# Patient Record
Sex: FEMALE | Race: WHITE | NOT HISPANIC OR LATINO
[De-identification: names, ages, dates, MRNs, and addresses within clinical notes are randomized per-mention and may not be internally consistent; named-entity substitution may affect disease eponyms.]

---

## 2017-10-20 ENCOUNTER — APPOINTMENT (OUTPATIENT)
Dept: HUMAN REPRODUCTION | Facility: CLINIC | Age: 32
End: 2017-10-20

## 2018-03-25 ENCOUNTER — OUTPATIENT (OUTPATIENT)
Dept: OUTPATIENT SERVICES | Facility: HOSPITAL | Age: 33
LOS: 1 days | End: 2018-03-25
Payer: COMMERCIAL

## 2018-03-25 PROCEDURE — 76830 TRANSVAGINAL US NON-OB: CPT | Mod: 26

## 2018-03-25 PROCEDURE — 76830 TRANSVAGINAL US NON-OB: CPT

## 2018-09-22 ENCOUNTER — OUTPATIENT (OUTPATIENT)
Dept: OUTPATIENT SERVICES | Facility: HOSPITAL | Age: 33
LOS: 1 days | End: 2018-09-22
Payer: COMMERCIAL

## 2018-09-22 PROCEDURE — 76830 TRANSVAGINAL US NON-OB: CPT | Mod: 26

## 2018-09-22 PROCEDURE — 76830 TRANSVAGINAL US NON-OB: CPT

## 2018-09-23 ENCOUNTER — APPOINTMENT (OUTPATIENT)
Dept: ULTRASOUND IMAGING | Facility: HOSPITAL | Age: 33
End: 2018-09-23

## 2020-02-06 ENCOUNTER — TRANSCRIPTION ENCOUNTER (OUTPATIENT)
Age: 35
End: 2020-02-06

## 2020-02-06 ENCOUNTER — RESULT REVIEW (OUTPATIENT)
Age: 35
End: 2020-02-06

## 2020-02-07 ENCOUNTER — INPATIENT (INPATIENT)
Facility: HOSPITAL | Age: 35
LOS: 1 days | Discharge: HOME | End: 2020-02-09
Attending: SURGERY | Admitting: SURGERY
Payer: COMMERCIAL

## 2020-02-07 VITALS
RESPIRATION RATE: 18 BRPM | SYSTOLIC BLOOD PRESSURE: 128 MMHG | HEART RATE: 90 BPM | DIASTOLIC BLOOD PRESSURE: 82 MMHG | TEMPERATURE: 98 F | OXYGEN SATURATION: 100 %

## 2020-02-07 DIAGNOSIS — K56.50 INTESTINAL ADHESIONS [BANDS], UNSPECIFIED AS TO PARTIAL VERSUS COMPLETE OBSTRUCTION: ICD-10-CM

## 2020-02-07 LAB
ALBUMIN SERPL ELPH-MCNC: 4.6 G/DL — SIGNIFICANT CHANGE UP (ref 3.3–5)
ALP SERPL-CCNC: 73 U/L — SIGNIFICANT CHANGE UP (ref 40–120)
ALT FLD-CCNC: 18 U/L — SIGNIFICANT CHANGE UP (ref 4–33)
ANION GAP SERPL CALC-SCNC: 14 MMO/L — SIGNIFICANT CHANGE UP (ref 7–14)
APPEARANCE UR: CLEAR — SIGNIFICANT CHANGE UP
APTT BLD: 27.3 SEC — LOW (ref 27.5–36.3)
AST SERPL-CCNC: 15 U/L — SIGNIFICANT CHANGE UP (ref 4–32)
BASOPHILS # BLD AUTO: 0.02 K/UL — SIGNIFICANT CHANGE UP (ref 0–0.2)
BASOPHILS NFR BLD AUTO: 0.2 % — SIGNIFICANT CHANGE UP (ref 0–2)
BILIRUB SERPL-MCNC: 0.6 MG/DL — SIGNIFICANT CHANGE UP (ref 0.2–1.2)
BILIRUB UR-MCNC: NEGATIVE — SIGNIFICANT CHANGE UP
BLD GP AB SCN SERPL QL: NEGATIVE — SIGNIFICANT CHANGE UP
BLOOD UR QL VISUAL: NEGATIVE — SIGNIFICANT CHANGE UP
BUN SERPL-MCNC: 7 MG/DL — SIGNIFICANT CHANGE UP (ref 7–23)
CALCIUM SERPL-MCNC: 9.6 MG/DL — SIGNIFICANT CHANGE UP (ref 8.4–10.5)
CHLORIDE SERPL-SCNC: 102 MMOL/L — SIGNIFICANT CHANGE UP (ref 98–107)
CO2 SERPL-SCNC: 20 MMOL/L — LOW (ref 22–31)
COLOR SPEC: COLORLESS — SIGNIFICANT CHANGE UP
CREAT SERPL-MCNC: 0.59 MG/DL — SIGNIFICANT CHANGE UP (ref 0.5–1.3)
EOSINOPHIL # BLD AUTO: 0 K/UL — SIGNIFICANT CHANGE UP (ref 0–0.5)
EOSINOPHIL NFR BLD AUTO: 0 % — SIGNIFICANT CHANGE UP (ref 0–6)
GLUCOSE SERPL-MCNC: 143 MG/DL — HIGH (ref 70–99)
GLUCOSE UR-MCNC: NEGATIVE — SIGNIFICANT CHANGE UP
HCG SERPL-ACNC: < 5 MIU/ML — SIGNIFICANT CHANGE UP
HCT VFR BLD CALC: 42.4 % — SIGNIFICANT CHANGE UP (ref 34.5–45)
HGB BLD-MCNC: 13.8 G/DL — SIGNIFICANT CHANGE UP (ref 11.5–15.5)
IMM GRANULOCYTES NFR BLD AUTO: 0.3 % — SIGNIFICANT CHANGE UP (ref 0–1.5)
INR BLD: 1.08 — SIGNIFICANT CHANGE UP (ref 0.88–1.17)
KETONES UR-MCNC: NEGATIVE — SIGNIFICANT CHANGE UP
LACTATE BLDV-MCNC: 2 MMOL/L — SIGNIFICANT CHANGE UP (ref 0.5–2)
LEUKOCYTE ESTERASE UR-ACNC: NEGATIVE — SIGNIFICANT CHANGE UP
LIDOCAIN IGE QN: 71.5 U/L — HIGH (ref 7–60)
LYMPHOCYTES # BLD AUTO: 1.43 K/UL — SIGNIFICANT CHANGE UP (ref 1–3.3)
LYMPHOCYTES # BLD AUTO: 12.4 % — LOW (ref 13–44)
MCHC RBC-ENTMCNC: 26.3 PG — LOW (ref 27–34)
MCHC RBC-ENTMCNC: 32.5 % — SIGNIFICANT CHANGE UP (ref 32–36)
MCV RBC AUTO: 80.8 FL — SIGNIFICANT CHANGE UP (ref 80–100)
MONOCYTES # BLD AUTO: 0.29 K/UL — SIGNIFICANT CHANGE UP (ref 0–0.9)
MONOCYTES NFR BLD AUTO: 2.5 % — SIGNIFICANT CHANGE UP (ref 2–14)
NEUTROPHILS # BLD AUTO: 9.72 K/UL — HIGH (ref 1.8–7.4)
NEUTROPHILS NFR BLD AUTO: 84.6 % — HIGH (ref 43–77)
NITRITE UR-MCNC: NEGATIVE — SIGNIFICANT CHANGE UP
NRBC # FLD: 0 K/UL — SIGNIFICANT CHANGE UP (ref 0–0)
PH UR: 7 — SIGNIFICANT CHANGE UP (ref 5–8)
PLATELET # BLD AUTO: 332 K/UL — SIGNIFICANT CHANGE UP (ref 150–400)
PMV BLD: 11.1 FL — SIGNIFICANT CHANGE UP (ref 7–13)
POTASSIUM SERPL-MCNC: 3.8 MMOL/L — SIGNIFICANT CHANGE UP (ref 3.5–5.3)
POTASSIUM SERPL-SCNC: 3.8 MMOL/L — SIGNIFICANT CHANGE UP (ref 3.5–5.3)
PROT SERPL-MCNC: 7.3 G/DL — SIGNIFICANT CHANGE UP (ref 6–8.3)
PROT UR-MCNC: NEGATIVE — SIGNIFICANT CHANGE UP
PROTHROM AB SERPL-ACNC: 12.4 SEC — SIGNIFICANT CHANGE UP (ref 9.8–13.1)
RBC # BLD: 5.25 M/UL — HIGH (ref 3.8–5.2)
RBC # FLD: 13.6 % — SIGNIFICANT CHANGE UP (ref 10.3–14.5)
RH IG SCN BLD-IMP: POSITIVE — SIGNIFICANT CHANGE UP
SODIUM SERPL-SCNC: 136 MMOL/L — SIGNIFICANT CHANGE UP (ref 135–145)
SP GR SPEC: 1.03 — SIGNIFICANT CHANGE UP (ref 1–1.04)
UROBILINOGEN FLD QL: NORMAL — SIGNIFICANT CHANGE UP
WBC # BLD: 11.5 K/UL — HIGH (ref 3.8–10.5)
WBC # FLD AUTO: 11.5 K/UL — HIGH (ref 3.8–10.5)

## 2020-02-07 PROCEDURE — 74177 CT ABD & PELVIS W/CONTRAST: CPT | Mod: 26

## 2020-02-07 PROCEDURE — 71045 X-RAY EXAM CHEST 1 VIEW: CPT | Mod: 26

## 2020-02-07 PROCEDURE — 88307 TISSUE EXAM BY PATHOLOGIST: CPT | Mod: 26

## 2020-02-07 PROCEDURE — 44120 REMOVAL OF SMALL INTESTINE: CPT

## 2020-02-07 RX ORDER — SODIUM CHLORIDE 9 MG/ML
1000 INJECTION INTRAMUSCULAR; INTRAVENOUS; SUBCUTANEOUS ONCE
Refills: 0 | Status: COMPLETED | OUTPATIENT
Start: 2020-02-07 | End: 2020-02-07

## 2020-02-07 RX ORDER — ONDANSETRON 8 MG/1
4 TABLET, FILM COATED ORAL ONCE
Refills: 0 | Status: COMPLETED | OUTPATIENT
Start: 2020-02-07 | End: 2020-02-07

## 2020-02-07 RX ORDER — OXYCODONE HYDROCHLORIDE 5 MG/1
10 TABLET ORAL EVERY 6 HOURS
Refills: 0 | Status: DISCONTINUED | OUTPATIENT
Start: 2020-02-07 | End: 2020-02-09

## 2020-02-07 RX ORDER — ENOXAPARIN SODIUM 100 MG/ML
40 INJECTION SUBCUTANEOUS DAILY
Refills: 0 | Status: DISCONTINUED | OUTPATIENT
Start: 2020-02-07 | End: 2020-02-09

## 2020-02-07 RX ORDER — ACETAMINOPHEN 500 MG
650 TABLET ORAL EVERY 6 HOURS
Refills: 0 | Status: DISCONTINUED | OUTPATIENT
Start: 2020-02-07 | End: 2020-02-08

## 2020-02-07 RX ORDER — FAMOTIDINE 10 MG/ML
20 INJECTION INTRAVENOUS ONCE
Refills: 0 | Status: COMPLETED | OUTPATIENT
Start: 2020-02-07 | End: 2020-02-07

## 2020-02-07 RX ORDER — SODIUM CHLORIDE 9 MG/ML
1000 INJECTION, SOLUTION INTRAVENOUS
Refills: 0 | Status: DISCONTINUED | OUTPATIENT
Start: 2020-02-07 | End: 2020-02-08

## 2020-02-07 RX ORDER — HYDROMORPHONE HYDROCHLORIDE 2 MG/ML
1 INJECTION INTRAMUSCULAR; INTRAVENOUS; SUBCUTANEOUS
Refills: 0 | Status: DISCONTINUED | OUTPATIENT
Start: 2020-02-07 | End: 2020-02-07

## 2020-02-07 RX ORDER — HYDROMORPHONE HYDROCHLORIDE 2 MG/ML
0.5 INJECTION INTRAMUSCULAR; INTRAVENOUS; SUBCUTANEOUS
Refills: 0 | Status: DISCONTINUED | OUTPATIENT
Start: 2020-02-07 | End: 2020-02-07

## 2020-02-07 RX ORDER — OXYCODONE HYDROCHLORIDE 5 MG/1
5 TABLET ORAL EVERY 6 HOURS
Refills: 0 | Status: DISCONTINUED | OUTPATIENT
Start: 2020-02-07 | End: 2020-02-09

## 2020-02-07 RX ORDER — KETOROLAC TROMETHAMINE 30 MG/ML
15 SYRINGE (ML) INJECTION ONCE
Refills: 0 | Status: DISCONTINUED | OUTPATIENT
Start: 2020-02-07 | End: 2020-02-07

## 2020-02-07 RX ORDER — ACETAMINOPHEN 500 MG
650 TABLET ORAL ONCE
Refills: 0 | Status: COMPLETED | OUTPATIENT
Start: 2020-02-07 | End: 2020-02-07

## 2020-02-07 RX ORDER — METFORMIN HYDROCHLORIDE 850 MG/1
1 TABLET ORAL
Qty: 0 | Refills: 0 | DISCHARGE

## 2020-02-07 RX ORDER — SODIUM CHLORIDE 9 MG/ML
1000 INJECTION, SOLUTION INTRAVENOUS
Refills: 0 | Status: DISCONTINUED | OUTPATIENT
Start: 2020-02-07 | End: 2020-02-07

## 2020-02-07 RX ORDER — INFLUENZA VIRUS VACCINE 15; 15; 15; 15 UG/.5ML; UG/.5ML; UG/.5ML; UG/.5ML
0.5 SUSPENSION INTRAMUSCULAR ONCE
Refills: 0 | Status: DISCONTINUED | OUTPATIENT
Start: 2020-02-07 | End: 2020-02-09

## 2020-02-07 RX ORDER — LIDOCAINE 4 G/100G
10 CREAM TOPICAL ONCE
Refills: 0 | Status: COMPLETED | OUTPATIENT
Start: 2020-02-07 | End: 2020-02-07

## 2020-02-07 RX ADMIN — HYDROMORPHONE HYDROCHLORIDE 0.5 MILLIGRAM(S): 2 INJECTION INTRAMUSCULAR; INTRAVENOUS; SUBCUTANEOUS at 15:29

## 2020-02-07 RX ADMIN — LIDOCAINE 10 MILLILITER(S): 4 CREAM TOPICAL at 07:36

## 2020-02-07 RX ADMIN — HYDROMORPHONE HYDROCHLORIDE 0.5 MILLIGRAM(S): 2 INJECTION INTRAMUSCULAR; INTRAVENOUS; SUBCUTANEOUS at 19:33

## 2020-02-07 RX ADMIN — FAMOTIDINE 20 MILLIGRAM(S): 10 INJECTION INTRAVENOUS at 07:36

## 2020-02-07 RX ADMIN — SODIUM CHLORIDE 1000 MILLILITER(S): 9 INJECTION INTRAMUSCULAR; INTRAVENOUS; SUBCUTANEOUS at 09:45

## 2020-02-07 RX ADMIN — OXYCODONE HYDROCHLORIDE 10 MILLIGRAM(S): 5 TABLET ORAL at 21:47

## 2020-02-07 RX ADMIN — SODIUM CHLORIDE 75 MILLILITER(S): 9 INJECTION, SOLUTION INTRAVENOUS at 15:30

## 2020-02-07 RX ADMIN — HYDROMORPHONE HYDROCHLORIDE 0.5 MILLIGRAM(S): 2 INJECTION INTRAMUSCULAR; INTRAVENOUS; SUBCUTANEOUS at 17:36

## 2020-02-07 RX ADMIN — Medication 650 MILLIGRAM(S): at 08:22

## 2020-02-07 RX ADMIN — Medication 15 MILLIGRAM(S): at 09:43

## 2020-02-07 RX ADMIN — OXYCODONE HYDROCHLORIDE 10 MILLIGRAM(S): 5 TABLET ORAL at 22:15

## 2020-02-07 RX ADMIN — SODIUM CHLORIDE 75 MILLILITER(S): 9 INJECTION, SOLUTION INTRAVENOUS at 15:35

## 2020-02-07 RX ADMIN — HYDROMORPHONE HYDROCHLORIDE 0.5 MILLIGRAM(S): 2 INJECTION INTRAMUSCULAR; INTRAVENOUS; SUBCUTANEOUS at 17:13

## 2020-02-07 RX ADMIN — Medication 15 MILLIGRAM(S): at 11:27

## 2020-02-07 RX ADMIN — ONDANSETRON 4 MILLIGRAM(S): 8 TABLET, FILM COATED ORAL at 19:07

## 2020-02-07 RX ADMIN — SODIUM CHLORIDE 1000 MILLILITER(S): 9 INJECTION INTRAMUSCULAR; INTRAVENOUS; SUBCUTANEOUS at 07:36

## 2020-02-07 RX ADMIN — ONDANSETRON 4 MILLIGRAM(S): 8 TABLET, FILM COATED ORAL at 07:36

## 2020-02-07 RX ADMIN — Medication 30 MILLILITER(S): at 07:36

## 2020-02-07 RX ADMIN — Medication 650 MILLIGRAM(S): at 07:36

## 2020-02-07 NOTE — ED PROVIDER NOTE - ATTENDING CONTRIBUTION TO CARE
35 y/o F with h/o previous  and ectopic pregnancy requiring salpingectomy here with abdominal pain n/v.  She reports sxs started yesterday afternoon after eating chips.  Since then with epigastric pain and n/, unable to tolerate PO.  She was seen at [facility] last night and had reportedly normal labs and some improvement after GI cocktail at the time, she declined CT imaging and had gone home. but upon return home again started to have pain associated with nausea and nbnb vomiting.  No fever, back pain, cp, urinary sxs, diarrhea.  Last BM yesterday morning.  She is no longer passing flatus.  No etoh use.  Well appearing, lying comfortably in stretcher, awake and alert, nontoxic.  AF/VSS.  Lungs cta bl.  Cards nl S1/S2, RRR, no MRG.  Abd soft obese with epigastric tenderness, no ruq tenderness, neg brambila's sign, no lower abd tenderness, no rebound or guarding.  No pedal edema or calf tenderness.  Will obtain labs, ua, ucg, antiemetics, gi cocktail, ivfs, ct ab/pel.  While exam is only significant for epigastric tenderness, she is not tolerating PO or passing flatus with significant abd surgical hx, will need to r/o sbo vs pancreatitis vs gastritis, do not suspect biliary disease without ruq tenderness presently.

## 2020-02-07 NOTE — H&P ADULT - NSHPPHYSICALEXAM_GEN_ALL_CORE
Physical Exam  T(C): 36.7  HR: 88 (88 - 90)  BP: 120/79 (120/79 - 132/60)  RR: 16 (16 - 18)  SpO2: 99% (99% - 100%)  Tmax: T(C): , Max: 36.7 (02-07-20 @ 09:28)    General: well developed, well nourished, NAD  Neuro: alert and oriented, no focal deficits, moves all extremities spontaneously  HEENT: NCAT, EOMI, anicteric, mucosa moist  Respiratory: airway patent, respirations unlabored  CVS: regular rate and rhythm  Abdomen: soft, TTP in epigastric region, nondistended  Extremities: no edema, sensation and movement grossly intact  Skin: warm, dry, appropriate color

## 2020-02-07 NOTE — CHART NOTE - NSCHARTNOTEFT_GEN_A_CORE
POST-OPERATIVE NOTE    Patient is s/p Dx laparoscopy and open SBR for meckels diverticulum.    Subjective:  Patient reports pain at the incisional site, controlled with medication  Denies chest pain, shortness of breath, nausea, vomiting  Is not yet passing gas or having bowel movements  Not yet urinating independently or ambulating independently    Vital Signs Last 24 Hrs  T(C): 36.9 (2020 15:05), Max: 36.9 (2020 15:05)  T(F): 98.4 (2020 15:05), Max: 98.4 (2020 15:05)  HR: 94 (2020 18:00) (76 - 103)  BP: 120/74 (2020 18:00) (118/63 - 138/75)  BP(mean): 85 (2020 18:00) (77 - 91)  RR: 19 (2020 18:00) (15 - 22)  SpO2: 94% (2020 18:00) (94% - 100%)  I&O's Detail    2020 07:01  -  2020 19:14  --------------------------------------------------------  IN:    lactated ringers.: 150 mL    Oral Fluid: 150 mL  Total IN: 300 mL    OUT:    Voided: 150 mL  Total OUT: 150 mL    Total NET: 150 mL        enoxaparin Injectable 40    PAST MEDICAL & SURGICAL HISTORY:  Factor 5 Leiden mutation, heterozygous  Pregnant   delivery delivered        Physical Exam:  General: NAD, resting comfortably in bed  Pulmonary: Nonlabored breathing, no respiratory distress, CTAB  Cardiovascular: NSR, no murmurs or rubs  Abdominal: soft, appropriately tender, nondistended. Incisions CDI  Extremities: P      LABS:                        13.8   11.50 )-----------( 332      ( 2020 07:38 )             42.4     02-07    136  |  102  |  7   ----------------------------<  143<H>  3.8   |  20<L>  |  0.59    Ca    9.6      2020 07:38    TPro  7.3  /  Alb  4.6  /  TBili  0.6  /  DBili  x   /  AST  15  /  ALT  18  /  AlkPhos  73  02-07    PT/INR - ( 2020 11:02 )   PT: 12.4 SEC;   INR: 1.08          PTT - ( 2020 11:02 )  PTT:27.3 SEC  CAPILLARY BLOOD GLUCOSE      Assessment:  The patient is a 33 y/o with PMhx of PCOS and Factor 5 Leiden mutation, hx of , salpingectomy, presenting with abd. pain x1 day. CT scan findings concerning for closed loop bowel obstruction, now several hours post-op from a Dx laparoscopy and open SBR for meckels diverticulum.    Plan:  - Pain control as needed, tylenol and oxycodone  - d/c IVF  - tolerating CLD, advance to regular   - DVT ppx lovenox  - OOB and ambulating as tolerated  - F/u AM labs POST-OPERATIVE NOTE    Patient is s/p Dx laparoscopy and open SBR for meckels diverticulum.    Subjective:  Patient reports mild pain at the incisional site, controlled with medication  Denies chest pain, shortness of breath, nausea, vomiting  Is not yet passing gas or having bowel movements  Urinating independently, not yet ambulating independently    Vital Signs Last 24 Hrs  T(C): 36.9 (2020 15:05), Max: 36.9 (2020 15:05)  T(F): 98.4 (2020 15:05), Max: 98.4 (2020 15:05)  HR: 94 (2020 18:00) (76 - 103)  BP: 120/74 (2020 18:00) (118/63 - 138/75)  BP(mean): 85 (2020 18:00) (77 - 91)  RR: 19 (2020 18:00) (15 - 22)  SpO2: 94% (2020 18:00) (94% - 100%)  I&O's Detail    2020 07:01  -  2020 19:14  --------------------------------------------------------  IN:    lactated ringers.: 150 mL    Oral Fluid: 150 mL  Total IN: 300 mL    OUT:    Voided: 150 mL  Total OUT: 150 mL    Total NET: 150 mL        enoxaparin Injectable 40    PAST MEDICAL & SURGICAL HISTORY:  Factor 5 Leiden mutation, heterozygous  Pregnant   delivery delivered        Physical Exam:  General: NAD, resting comfortably in bed  Pulmonary: Nonlabored breathing, no respiratory distress, CTAB  Cardiovascular: NSR, no murmurs or rubs  Abdominal: soft, nontender, nondistended. Laparoscopic and small midline incisions CDI, dressings in place  Extremities: [facility]      LABS:                        13.8   11.50 )-----------( 332      ( 2020 07:38 )             42.4     02-07    136  |  102  |  7   ----------------------------<  143<H>  3.8   |  20<L>  |  0.59    Ca    9.6      2020 07:38    TPro  7.3  /  Alb  4.6  /  TBili  0.6  /  DBili  x   /  AST  15  /  ALT  18  /  AlkPhos  73  02-07    PT/INR - ( 2020 11:02 )   PT: 12.4 SEC;   INR: 1.08          PTT - ( 2020 11:02 )  PTT:27.3 SEC  CAPILLARY BLOOD GLUCOSE      Assessment:  The patient is a 35 y/o with PMhx of PCOS and Factor 5 Leiden mutation, hx of , salpingectomy, presenting with abd. pain x1 day. CT scan findings concerning for closed loop bowel obstruction, now several hours post-op from a Dx laparoscopy and open SBR for meckels diverticulum.    Plan:  - Pain control as needed, tylenol and oxycodone  - d/c IVF  - tolerating CLD, continue overnight  - DVT ppx lovenox  - OOB and ambulating as tolerated  - F/u AM labs

## 2020-02-07 NOTE — ED ADULT NURSE NOTE - CHIEF COMPLAINT QUOTE
Pt started having abdominal pain, nausea and vomiting Yesterday and went to [facility] Yesterday. Pt was given anti nausea medicine and Pepcid  and pt went home. She woke up with pain this AM and vomited once. PMH of PCOS and is on Metformin,

## 2020-02-07 NOTE — ED ADULT NURSE REASSESSMENT NOTE - NS ED NURSE REASSESS COMMENT FT1
remains alert,oriented x3. meds as ordered c/o abd pain. md to speak with pt re ct scan results. labs sent

## 2020-02-07 NOTE — ED ADULT NURSE REASSESSMENT NOTE - NS ED NURSE REASSESS COMMENT FT1
pt taken to or by surg team. ng tube in place. ,mom at bedside. belomgings to family. pt  remains alert,oriented x3.

## 2020-02-07 NOTE — ED ADULT NURSE REASSESSMENT NOTE - NS ED NURSE REASSESS COMMENT FT1
pt alert,oriented x3 . skin warm,dry. denies n,v. c/o continued abd pains,states less at pt. md aware. will medicate as ordered. pt ambulates to br without difficulty for urine sample. will continue to monitor

## 2020-02-07 NOTE — ED ADULT NURSE NOTE - OBJECTIVE STATEMENT
33yo female c/o severe medial abd pain X2 days prior to ED arrival. pt indicates she was recently d/c from [facility] for same symptoms

## 2020-02-07 NOTE — H&P ADULT - ATTENDING COMMENTS
Pt seen and examined.  Agree with resident eval and plan.  Pt with closed loop obstruction on CT scan concerning for ischemia.  D/w pt and family risks and benefits of surgery and urgent need for surgical intervention and they consent.

## 2020-02-07 NOTE — ED PROVIDER NOTE - CLINICAL SUMMARY MEDICAL DECISION MAKING FREE TEXT BOX
Trent MCGINNIS MD PGY2: Pt here with epigastric pain, N/V/ inability to pass BM and flatus c/f gastritis, pancreatitis, less likely SBO given pain restricted to epigastrium. Will obtain basic labs, treat symptomatically, PO challenge. If negative and unable to tolerate PO, will obtain CTAP to assess for SBO.

## 2020-02-07 NOTE — H&P ADULT - NSHPLABSRESULTS_GEN_ALL_CORE
Labs:                        13.8   11.50 )-----------( 332      ( 2020 07:38 )             42.4     PT/INR - ( 2020 11:02 )   PT: 12.4 SEC;   INR: 1.08          PTT - ( 2020 11:02 )  PTT:27.3 SEC      136  |  102  |  7   ----------------------------<  143<H>  3.8   |  20<L>  |  0.59    Ca    9.6      2020 07:38    TPro  7.3  /  Alb  4.6  /  TBili  0.6  /  DBili  x   /  AST  15  /  ALT  18  /  AlkPhos  73  02-07    Urinalysis Basic - ( 2020 09:45 )    Color: COLORLESS / Appearance: CLEAR / S.032 / pH: 7.0  Gluc: NEGATIVE / Ketone: NEGATIVE  / Bili: NEGATIVE / Urobili: NORMAL   Blood: NEGATIVE / Protein: NEGATIVE / Nitrite: NEGATIVE   Leuk Esterase: NEGATIVE / RBC: x / WBC x   Sq Epi: x / Non Sq Epi: x / Bacteria: x            Imaging and other studies:  < from: CT Abdomen and Pelvis w/ IV Cont (20 @ 09:23) >      EXAM:  CT ABDOMEN AND PELVIS IC        PROCEDURE DATE:  2020         INTERPRETATION:  CLINICAL INFORMATION: Abdominal pain, nausea and vomiting evaluate for small bowel obstruction.    COMPARISON: Transvaginal ultrasound from 2016.    PROCEDURE:   CT of the Abdomen and Pelvis was performed with intravenous contrast.   Intravenous contrast: 90 ml Omnipaque 350. 10 ml discarded.  Oral contrast: None.  Sagittal and coronal reformats were performed.    FINDINGS:    LOWER CHEST: Within normal limits.    LIVER: Steatosis.  BILE DUCTS: Normal caliber.  GALLBLADDER: Within normal limits.  SPLEEN: Within normal limits.  PANCREAS: Within normal limits.  ADRENALS: Within normal limits.  KIDNEYS/URETERS: Within normal limits.    BLADDER: Within normal limits.  REPRODUCTIVE ORGANS: Uterus and adnexa within normal limits    BOWEL: Distended and fecalized loop of small bowel in the mid hemiabdomen with distal transition point (602, 30) in the anterior right lower quadrant. I There is smallamount of associated mesenteric edema, bowel wall thickening and stranding. Appendix is normal.  PERITONEUM: Small ascites.  VESSELS: Within normal limits.  RETROPERITONEUM/LYMPH NODES: No lymphadenopathy.    ABDOMINAL WALL: Within normal limits.  BONES: Within normal limits.    IMPRESSION:   High-grade small bowel obstruction with suggestion ischemic changes    These findings were discussed with Dr. Carnes at 2020 9:43 AM by Dr. Bryant with read back confirmation.

## 2020-02-07 NOTE — ED ADULT TRIAGE NOTE - CHIEF COMPLAINT QUOTE
Pt started having abdominal pain, nausea and vomiting Yesterday and went to [facility] Yesterday. Pt was given anti nausea medicine and Pepcid  and pt went home. She woke up with pain this AM and vomited once. PMH of PCOS and is on Metformin,

## 2020-02-07 NOTE — ED PROVIDER NOTE - PROGRESS NOTE DETAILS
Dr. Rosa: Pt was signed out to me awaiting CT and UA. Pt noted to have epigastric pain improving. Awaiting CT and UA.

## 2020-02-07 NOTE — H&P ADULT - HISTORY OF PRESENT ILLNESS
35 y/o with PMhx of PCOS presenting with 33 y/o with PMhx of PCOS presenting with abdominal pain x1 day. Patient reports that pain started yesterday afternoon. She initially went to OSH however did not want to wait for CT scan and signed out AMA. Patient reports cramping epigastric pain. Associated with nausea and emesis x3. Reports last bowel movement was yesterday morning and has not passed any flatus since then. Has never had any similar symptoms in the past. Denies fevers, chills, chest pain, SOB, diarrhea, weight loss, changes in urinary habits, recent travel or illness.

## 2020-02-07 NOTE — BRIEF OPERATIVE NOTE - NSICDXBRIEFPROCEDURE_GEN_ALL_CORE_FT
PROCEDURES:  Small bowel resection with anastomosis 07-Feb-2020 15:05:41  Everton Hagan  Meckel diverticulum excision 07-Feb-2020 15:05:33  Everton Hagan  Diagnostic laparoscopy 07-Feb-2020 15:05:06  Everton Hagan

## 2020-02-07 NOTE — ED PROVIDER NOTE - OBJECTIVE STATEMENT
Trent MCGINNIS MD PGY2: 34 F PMH lap salpingectomy for ectopic, C section here for epigastric pain since 2pm yesterday mornign assoc with inability to tolerate any PO intake whatsoever, N/V/ and lack of flatus. No hx of SBO. Was seen at [facility] yesterday and had labs and given pepcid that didn't help. Was being considered for a CT scan, but left AMA as she felt mildly better and wasn't happy with care. On the way home, had to pull over and throw up. No fever, chills, CP, SOB, cough.

## 2020-02-07 NOTE — H&P ADULT - ASSESSMENT
35 y/o with PMhx of PCOS, hx of , salpingectomy, presenting with abd. pain x1 day, with CT scan findings concerning for closed loop bowel obstruction     - admit to B Team/ Dr. Nelson   - NPO   - IVF   - NGT   - added on for diagnostic lap, possible bowel resection   - consent in chart   - DVT ppx     Discussed with attending Dr. Nelson     [id number] B Team

## 2020-02-07 NOTE — ED PROVIDER NOTE - PHYSICAL EXAMINATION
Trent MCGINNIS MD PGY2:   PHYSICAL EXAM:    GENERAL: NAD, well-developed  HEENT:  Atraumatic, Normocephalic  CHEST/LUNG: Chest rise equal bilaterally. CTAB.   HEART: Regular rate and rhythm  ABDOMEN: Epigastric TTP.   EXTREMITIES:  2+ Peripheral Pulses.  PSYCH: A&Ox3.   SKIN: No obvious rashes or lesions

## 2020-02-07 NOTE — BRIEF OPERATIVE NOTE - OPERATION/FINDINGS
Diagnostic laparoscopy  Dilated small bowel noted in the left lower quadrant - which was run proximally from terminal ileum  Patient noted to have meckel's diverticulum, which was inflamed and distal to fecalized inflamed small bowel   Supraumbilical port site extended proximally ~5cm and wound protector placed - involved small bowel brought up through incision  Stool noted to pass easily through involved bowel - no mechanical obstruction seen  Meckel's diverticulum and immediately surrounding small bowel resected using Endo JANET stapler  Side to side anastamosis  Hemostasis achieved  Fascia and skin closed

## 2020-02-07 NOTE — ED ADULT NURSE REASSESSMENT NOTE - NS ED NURSE REASSESS COMMENT FT1
pt evaluated by ed mds. c/o continued pain to penis with erection,states less than earlier,pain present. md to revaluate. will continue to monitor

## 2020-02-07 NOTE — ASU PREOP CHECKLIST - BLOOD AVAILABLE
Pt needs CT to be sent if blood is to be ordered.  Emergency case.  Note in chart from Dr. Nelson Pt needs CT to be sent if blood is to be ordered.  Emergency case.  Note in chart from Dr. Nelson/n/a

## 2020-02-08 LAB
ANION GAP SERPL CALC-SCNC: 12 MMO/L — SIGNIFICANT CHANGE UP (ref 7–14)
BUN SERPL-MCNC: 5 MG/DL — LOW (ref 7–23)
CALCIUM SERPL-MCNC: 8.4 MG/DL — SIGNIFICANT CHANGE UP (ref 8.4–10.5)
CHLORIDE SERPL-SCNC: 101 MMOL/L — SIGNIFICANT CHANGE UP (ref 98–107)
CO2 SERPL-SCNC: 23 MMOL/L — SIGNIFICANT CHANGE UP (ref 22–31)
CREAT SERPL-MCNC: 0.55 MG/DL — SIGNIFICANT CHANGE UP (ref 0.5–1.3)
GLUCOSE SERPL-MCNC: 122 MG/DL — HIGH (ref 70–99)
HCT VFR BLD CALC: 35.9 % — SIGNIFICANT CHANGE UP (ref 34.5–45)
HGB BLD-MCNC: 12 G/DL — SIGNIFICANT CHANGE UP (ref 11.5–15.5)
MAGNESIUM SERPL-MCNC: 1.8 MG/DL — SIGNIFICANT CHANGE UP (ref 1.6–2.6)
MCHC RBC-ENTMCNC: 27.1 PG — SIGNIFICANT CHANGE UP (ref 27–34)
MCHC RBC-ENTMCNC: 33.4 % — SIGNIFICANT CHANGE UP (ref 32–36)
MCV RBC AUTO: 81.2 FL — SIGNIFICANT CHANGE UP (ref 80–100)
NRBC # FLD: 0 K/UL — SIGNIFICANT CHANGE UP (ref 0–0)
PHOSPHATE SERPL-MCNC: 2.6 MG/DL — SIGNIFICANT CHANGE UP (ref 2.5–4.5)
PLATELET # BLD AUTO: 260 K/UL — SIGNIFICANT CHANGE UP (ref 150–400)
PMV BLD: 11.2 FL — SIGNIFICANT CHANGE UP (ref 7–13)
POTASSIUM SERPL-MCNC: 3.4 MMOL/L — LOW (ref 3.5–5.3)
POTASSIUM SERPL-SCNC: 3.4 MMOL/L — LOW (ref 3.5–5.3)
RBC # BLD: 4.42 M/UL — SIGNIFICANT CHANGE UP (ref 3.8–5.2)
RBC # FLD: 14.1 % — SIGNIFICANT CHANGE UP (ref 10.3–14.5)
SODIUM SERPL-SCNC: 136 MMOL/L — SIGNIFICANT CHANGE UP (ref 135–145)
WBC # BLD: 9.16 K/UL — SIGNIFICANT CHANGE UP (ref 3.8–10.5)
WBC # FLD AUTO: 9.16 K/UL — SIGNIFICANT CHANGE UP (ref 3.8–10.5)

## 2020-02-08 RX ORDER — POTASSIUM CHLORIDE 20 MEQ
20 PACKET (EA) ORAL
Refills: 0 | Status: COMPLETED | OUTPATIENT
Start: 2020-02-08 | End: 2020-02-08

## 2020-02-08 RX ORDER — FLUCONAZOLE 150 MG/1
150 TABLET ORAL ONCE
Refills: 0 | Status: COMPLETED | OUTPATIENT
Start: 2020-02-08 | End: 2020-02-08

## 2020-02-08 RX ORDER — ACETAMINOPHEN 500 MG
650 TABLET ORAL EVERY 6 HOURS
Refills: 0 | Status: DISCONTINUED | OUTPATIENT
Start: 2020-02-08 | End: 2020-02-09

## 2020-02-08 RX ORDER — DEXTROSE MONOHYDRATE, SODIUM CHLORIDE, AND POTASSIUM CHLORIDE 50; .745; 4.5 G/1000ML; G/1000ML; G/1000ML
1000 INJECTION, SOLUTION INTRAVENOUS
Refills: 0 | Status: DISCONTINUED | OUTPATIENT
Start: 2020-02-08 | End: 2020-02-09

## 2020-02-08 RX ORDER — IBUPROFEN 200 MG
400 TABLET ORAL EVERY 6 HOURS
Refills: 0 | Status: DISCONTINUED | OUTPATIENT
Start: 2020-02-08 | End: 2020-02-09

## 2020-02-08 RX ADMIN — Medication 650 MILLIGRAM(S): at 18:00

## 2020-02-08 RX ADMIN — FLUCONAZOLE 150 MILLIGRAM(S): 150 TABLET ORAL at 23:32

## 2020-02-08 RX ADMIN — Medication 650 MILLIGRAM(S): at 23:32

## 2020-02-08 RX ADMIN — Medication 400 MILLIGRAM(S): at 12:15

## 2020-02-08 RX ADMIN — Medication 650 MILLIGRAM(S): at 02:45

## 2020-02-08 RX ADMIN — Medication 20 MILLIEQUIVALENT(S): at 13:50

## 2020-02-08 RX ADMIN — Medication 400 MILLIGRAM(S): at 23:32

## 2020-02-08 RX ADMIN — OXYCODONE HYDROCHLORIDE 10 MILLIGRAM(S): 5 TABLET ORAL at 04:13

## 2020-02-08 RX ADMIN — Medication 400 MILLIGRAM(S): at 11:22

## 2020-02-08 RX ADMIN — Medication 400 MILLIGRAM(S): at 17:15

## 2020-02-08 RX ADMIN — SODIUM CHLORIDE 75 MILLILITER(S): 9 INJECTION, SOLUTION INTRAVENOUS at 10:00

## 2020-02-08 RX ADMIN — DEXTROSE MONOHYDRATE, SODIUM CHLORIDE, AND POTASSIUM CHLORIDE 75 MILLILITER(S): 50; .745; 4.5 INJECTION, SOLUTION INTRAVENOUS at 19:25

## 2020-02-08 RX ADMIN — Medication 650 MILLIGRAM(S): at 11:23

## 2020-02-08 RX ADMIN — ENOXAPARIN SODIUM 40 MILLIGRAM(S): 100 INJECTION SUBCUTANEOUS at 11:25

## 2020-02-08 RX ADMIN — Medication 400 MILLIGRAM(S): at 18:00

## 2020-02-08 RX ADMIN — Medication 650 MILLIGRAM(S): at 17:15

## 2020-02-08 RX ADMIN — Medication 650 MILLIGRAM(S): at 02:12

## 2020-02-08 RX ADMIN — OXYCODONE HYDROCHLORIDE 10 MILLIGRAM(S): 5 TABLET ORAL at 04:50

## 2020-02-08 RX ADMIN — Medication 650 MILLIGRAM(S): at 12:15

## 2020-02-08 NOTE — PROGRESS NOTE ADULT - SUBJECTIVE AND OBJECTIVE BOX
POD #: 1    No acute events overnight. Requested pain medication x1, repositioned to chair for comfort.    SUBJECTIVE:  Reports minimal but appropriate incisional pain  Denies nausea, vomiting  Is not yet passing gas and having bowel movements  Tolerating liquid diet  Ambulating independently    OBJECTIVE:  Vital Signs Last 24 Hrs  T(C): 36.8 (07 Feb 2020 21:00), Max: 36.9 (07 Feb 2020 15:05)  T(F): 98.2 (07 Feb 2020 21:00), Max: 98.4 (07 Feb 2020 15:05)  HR: 89 (07 Feb 2020 21:00) (76 - 103)  BP: 122/81 (07 Feb 2020 21:00) (118/63 - 138/75)  BP(mean): 85 (07 Feb 2020 18:00) (77 - 91)  RR: 18 (07 Feb 2020 21:00) (15 - 22)  SpO2: 100% (07 Feb 2020 21:00) (95% - 100%)      Physical Examination:  General: NAD, resting comfortably in bed  Pulmonary: Nonlabored breathing, no respiratory distress, CTAB  Cardiovascular: NSR, no murmurs or rubs  Abdominal: soft, nontender, nondistended. Laparoscopic and small midline incisions CDI, dressings in place  Extremities: WWP    LABS:                        13.8   11.50 )-----------( 332      ( 07 Feb 2020 07:38 )             42.4       02-07    136  |  102  |  7   ----------------------------<  143<H>  3.8   |  20<L>  |  0.59    Ca    9.6      07 Feb 2020 07:38    TPro  7.3  /  Alb  4.6  /  TBili  0.6  /  DBili  x   /  AST  15  /  ALT  18  /  AlkPhos  73  02-07

## 2020-02-08 NOTE — PROGRESS NOTE ADULT - SUBJECTIVE AND OBJECTIVE BOX
ANESTHESIA POSTOP CHECK    34y Female POSTOP DAY 1 S/P laparoscopy, GERALDINE, small bowel resection    Vital Signs Last 24 Hrs  T(C): 36.7 (08 Feb 2020 14:03), Max: 37.6 (08 Feb 2020 11:00)  T(F): 98.1 (08 Feb 2020 14:03), Max: 99.6 (08 Feb 2020 11:00)  HR: 105 (08 Feb 2020 14:03) (76 - 105)  BP: 104/55 (08 Feb 2020 14:03) (104/55 - 138/75)  BP(mean): 85 (07 Feb 2020 18:00) (77 - 91)  RR: 18 (08 Feb 2020 14:03) (15 - 22)  SpO2: 99% (08 Feb 2020 14:03) (95% - 100%)  I&O's Summary    07 Feb 2020 07:01  -  08 Feb 2020 07:00  --------------------------------------------------------  IN: 550 mL / OUT: 850 mL / NET: -300 mL    08 Feb 2020 07:01  -  08 Feb 2020 14:57  --------------------------------------------------------  IN: 150 mL / OUT: 900 mL / NET: -750 mL        [x] NO APPARENT ANESTHESIA COMPLICATIONS    Noel Curry MD [id number]  Pager [phone] / [phone]

## 2020-02-09 ENCOUNTER — TRANSCRIPTION ENCOUNTER (OUTPATIENT)
Age: 35
End: 2020-02-09

## 2020-02-09 VITALS
SYSTOLIC BLOOD PRESSURE: 124 MMHG | RESPIRATION RATE: 18 BRPM | DIASTOLIC BLOOD PRESSURE: 78 MMHG | TEMPERATURE: 98 F | HEART RATE: 84 BPM | OXYGEN SATURATION: 100 %

## 2020-02-09 LAB
ANION GAP SERPL CALC-SCNC: 10 MMO/L — SIGNIFICANT CHANGE UP (ref 7–14)
BUN SERPL-MCNC: 6 MG/DL — LOW (ref 7–23)
CALCIUM SERPL-MCNC: 8.6 MG/DL — SIGNIFICANT CHANGE UP (ref 8.4–10.5)
CHLORIDE SERPL-SCNC: 104 MMOL/L — SIGNIFICANT CHANGE UP (ref 98–107)
CO2 SERPL-SCNC: 24 MMOL/L — SIGNIFICANT CHANGE UP (ref 22–31)
CREAT SERPL-MCNC: 0.54 MG/DL — SIGNIFICANT CHANGE UP (ref 0.5–1.3)
GLUCOSE SERPL-MCNC: 119 MG/DL — HIGH (ref 70–99)
HCT VFR BLD CALC: 34.8 % — SIGNIFICANT CHANGE UP (ref 34.5–45)
HGB BLD-MCNC: 11.2 G/DL — LOW (ref 11.5–15.5)
MAGNESIUM SERPL-MCNC: 2.1 MG/DL — SIGNIFICANT CHANGE UP (ref 1.6–2.6)
MCHC RBC-ENTMCNC: 26.4 PG — LOW (ref 27–34)
MCHC RBC-ENTMCNC: 32.2 % — SIGNIFICANT CHANGE UP (ref 32–36)
MCV RBC AUTO: 81.9 FL — SIGNIFICANT CHANGE UP (ref 80–100)
NRBC # FLD: 0 K/UL — SIGNIFICANT CHANGE UP (ref 0–0)
PHOSPHATE SERPL-MCNC: 1.3 MG/DL — LOW (ref 2.5–4.5)
PLATELET # BLD AUTO: 238 K/UL — SIGNIFICANT CHANGE UP (ref 150–400)
PMV BLD: 11.1 FL — SIGNIFICANT CHANGE UP (ref 7–13)
POTASSIUM SERPL-MCNC: 3.8 MMOL/L — SIGNIFICANT CHANGE UP (ref 3.5–5.3)
POTASSIUM SERPL-SCNC: 3.8 MMOL/L — SIGNIFICANT CHANGE UP (ref 3.5–5.3)
RBC # BLD: 4.25 M/UL — SIGNIFICANT CHANGE UP (ref 3.8–5.2)
RBC # FLD: 14 % — SIGNIFICANT CHANGE UP (ref 10.3–14.5)
SODIUM SERPL-SCNC: 138 MMOL/L — SIGNIFICANT CHANGE UP (ref 135–145)
WBC # BLD: 6.75 K/UL — SIGNIFICANT CHANGE UP (ref 3.8–10.5)
WBC # FLD AUTO: 6.75 K/UL — SIGNIFICANT CHANGE UP (ref 3.8–10.5)

## 2020-02-09 RX ORDER — SODIUM,POTASSIUM PHOSPHATES 278-250MG
1 POWDER IN PACKET (EA) ORAL
Refills: 0 | Status: DISCONTINUED | OUTPATIENT
Start: 2020-02-09 | End: 2020-02-09

## 2020-02-09 RX ORDER — POTASSIUM CHLORIDE 20 MEQ
20 PACKET (EA) ORAL
Refills: 0 | Status: COMPLETED | OUTPATIENT
Start: 2020-02-09 | End: 2020-02-09

## 2020-02-09 RX ORDER — OXYCODONE HYDROCHLORIDE 5 MG/1
1 TABLET ORAL
Qty: 10 | Refills: 0
Start: 2020-02-09

## 2020-02-09 RX ORDER — ACETAMINOPHEN 500 MG
650 TABLET ORAL EVERY 6 HOURS
Refills: 0 | Status: DISCONTINUED | OUTPATIENT
Start: 2020-02-09 | End: 2020-02-09

## 2020-02-09 RX ADMIN — Medication 1 TABLET(S): at 11:45

## 2020-02-09 RX ADMIN — Medication 20 MILLIEQUIVALENT(S): at 17:38

## 2020-02-09 RX ADMIN — Medication 400 MILLIGRAM(S): at 14:30

## 2020-02-09 RX ADMIN — Medication 650 MILLIGRAM(S): at 11:46

## 2020-02-09 RX ADMIN — Medication 650 MILLIGRAM(S): at 12:31

## 2020-02-09 RX ADMIN — Medication 650 MILLIGRAM(S): at 18:15

## 2020-02-09 RX ADMIN — OXYCODONE HYDROCHLORIDE 5 MILLIGRAM(S): 5 TABLET ORAL at 09:12

## 2020-02-09 RX ADMIN — Medication 85 MILLIMOLE(S): at 11:45

## 2020-02-09 RX ADMIN — Medication 1 TABLET(S): at 17:38

## 2020-02-09 RX ADMIN — Medication 400 MILLIGRAM(S): at 13:45

## 2020-02-09 RX ADMIN — Medication 650 MILLIGRAM(S): at 17:38

## 2020-02-09 RX ADMIN — Medication 400 MILLIGRAM(S): at 07:40

## 2020-02-09 RX ADMIN — Medication 20 MILLIEQUIVALENT(S): at 13:45

## 2020-02-09 RX ADMIN — Medication 400 MILLIGRAM(S): at 08:20

## 2020-02-09 RX ADMIN — Medication 400 MILLIGRAM(S): at 00:02

## 2020-02-09 RX ADMIN — ENOXAPARIN SODIUM 40 MILLIGRAM(S): 100 INJECTION SUBCUTANEOUS at 11:45

## 2020-02-09 RX ADMIN — Medication 650 MILLIGRAM(S): at 00:02

## 2020-02-09 RX ADMIN — Medication 20 MILLIEQUIVALENT(S): at 11:45

## 2020-02-09 RX ADMIN — OXYCODONE HYDROCHLORIDE 5 MILLIGRAM(S): 5 TABLET ORAL at 10:00

## 2020-02-09 NOTE — DISCHARGE NOTE PROVIDER - NSDCFUADDINST_GEN_ALL_CORE_FT
You may use tylenol or motrin for pain control every 6 hours, with oxycodone as needed if pain is not controlled with the tylenol/motrin. We suggest staggering the tylenol motrin every 3 hours for maximum pain relief.     You may shower, do not soak in the tub, pool, or ocean. The steri-strips will fall off on their own, do not pull them off. You will follow up outpatient with someone in Dr. Nelson's group to monitor your progress, please call the number provided. Please let them know that Dr. Nelson operated on you.    You should call the doctor's office if you develop intractable nausea, vomiting, or pain that cannot be controlled with oxycodone. If the doctor's office is not open or you feel this is an emergency, please call 911 or visit the nearest emergency room.

## 2020-02-09 NOTE — PROGRESS NOTE ADULT - ASSESSMENT
The patient is a 33 y/o with PMhx of PCOS and Factor 5 Leiden mutation, hx of , salpingectomy, presenting with abd. pain x1 day. CT scan findings concerning for closed loop bowel obstruction, now s/p Dx laparoscopy and open SBR for meckels diverticulum .    Plan:  - Pain control as needed, tylenol motrin and oxycodone  - tolerating CLD, will consider advancing to regular  - mIVF  - DVT ppx lovenox  - OOB and ambulating as tolerated    B Team Surg  [id number] The patient is a 35 y/o with PMhx of PCOS and Factor 5 Leiden mutation, hx of , salpingectomy, presenting with abd. pain x1 day. CT scan findings concerning for closed loop bowel obstruction, now s/p Dx laparoscopy and open SBR for meckels diverticulum .    Plan:  - Pain control as needed, tylenol motrin and oxycodone  - advance to regular low fat diet  - d/c IVF  - DVT ppx lovenox  - OOB and ambulating as tolerated  - discharge today    B Team Surg  [id number]

## 2020-02-09 NOTE — DISCHARGE NOTE PROVIDER - HOSPITAL COURSE
35 y/o with PMhx of PCOS presenting with abdominal pain x1 day. She initially went to OSH however did not want to wait for CT scan and signed out AMA. Patient reports cramping epigastric pain. Associated with nausea and emesis x3. CT scan findings were concerning for closed loop bowel obstruction, so she was taken to the OR emergently for exploration. Patient underwent a diagnostic laparoscopy and SBR through extended supraumbillical port 2/2 inflamed Meckel's diverticulum. Post operatively she recovered well, with her pain mostly controlled on tylenol and motrin. She was able to tolerate a regular low fat diet and was passing gas at time of discharge.

## 2020-02-09 NOTE — DISCHARGE NOTE PROVIDER - NSDCMRMEDTOKEN_GEN_ALL_CORE_FT
metFORMIN 500 mg oral tablet: 1 tab(s) orally once a day  oxyCODONE 5 mg oral tablet: 1 tab(s) orally every 6 hours, As Needed -for severe pain MDD:4 tabs

## 2020-02-09 NOTE — PROGRESS NOTE ADULT - SUBJECTIVE AND OBJECTIVE BOX
POD #: 2    Diflucan for yeast infxn ovn    SUBJECTIVE:  Reports minimal but appropriate incisional pain  Denies nausea, vomiting  Voiding  Is not yet passing gas and having bowel movements  Tolerating liquid diet  Ambulating independently    OBJECTIVE:  Vital Signs Last 24 Hrs  T(C): 37 (08 Feb 2020 21:17), Max: 37.6 (08 Feb 2020 11:00)  T(F): 98.6 (08 Feb 2020 21:17), Max: 99.6 (08 Feb 2020 11:00)  HR: 76 (08 Feb 2020 21:17) (76 - 105)  BP: 113/66 (08 Feb 2020 21:17) (104/55 - 115/76)  RR: 16 (08 Feb 2020 21:17) (16 - 18)  SpO2: 98% (08 Feb 2020 21:17) (96% - 100%)    Physical Examination:  General: NAD, resting comfortably in bed  Pulmonary: Nonlabored breathing, no respiratory distress, CTAB  Cardiovascular: NSR, no murmurs or rubs  Abdominal: soft, nontender, distended. Laparoscopic and small midline incisions CDI, dressings in place spotting  Extremities: WWP    LABS:             12.0   9.16  )-----------( 260      ( 08 Feb 2020 06:20 )             35.9     02-08    136  |  101  |  5<L>  ----------------------------<  122<H>  3.4<L>   |  23  |  0.55    Ca    8.4      08 Feb 2020 06:20  Phos  2.6     02-08  Mg     1.8     02-08    TPro  7.3  /  Alb  4.6  /  TBili  0.6  /  DBili  x   /  AST  15  /  ALT  18  /  AlkPhos  73  02-07 POD #: 2    Diflucan for yeast infxn ovn    SUBJECTIVE:  Reports minimal but appropriate incisional pain  Denies nausea, vomiting  Voiding  Is passing gas but not yet having bowel movements  Tolerating liquid diet  Ambulating independently    OBJECTIVE:  Vital Signs Last 24 Hrs  T(C): 37 (08 Feb 2020 21:17), Max: 37.6 (08 Feb 2020 11:00)  T(F): 98.6 (08 Feb 2020 21:17), Max: 99.6 (08 Feb 2020 11:00)  HR: 76 (08 Feb 2020 21:17) (76 - 105)  BP: 113/66 (08 Feb 2020 21:17) (104/55 - 115/76)  RR: 16 (08 Feb 2020 21:17) (16 - 18)  SpO2: 98% (08 Feb 2020 21:17) (96% - 100%)    Physical Examination:  General: NAD, resting comfortably in bed  Pulmonary: Nonlabored breathing, no respiratory distress, CTAB  Cardiovascular: NSR, no murmurs or rubs  Abdominal: soft, nontender, distended. Laparoscopic and small midline incisions CDI, dressings in place spotting  Extremities: WWP    LABS:             12.0   9.16  )-----------( 260      ( 08 Feb 2020 06:20 )             35.9     02-08    136  |  101  |  5<L>  ----------------------------<  122<H>  3.4<L>   |  23  |  0.55    Ca    8.4      08 Feb 2020 06:20  Phos  2.6     02-08  Mg     1.8     02-08    TPro  7.3  /  Alb  4.6  /  TBili  0.6  /  DBili  x   /  AST  15  /  ALT  18  /  AlkPhos  73  02-07

## 2020-02-09 NOTE — DISCHARGE NOTE NURSING/CASE MANAGEMENT/SOCIAL WORK - PATIENT PORTAL LINK FT
You can access the FollowMyHealth Patient Portal offered by [facility] by registering at the following website: [email or web address]. By joining [employer]’s FollowMyHealth portal, you will also be able to view your health information using other applications (apps) compatible with our system.

## 2020-02-09 NOTE — DISCHARGE NOTE PROVIDER - CARE PROVIDER_API CALL
Steffen Blanco)  Surgery; Surgical Critical Care  [address] [place] [address]  Phone: [phone]  Fax: [phone]  Follow Up Time: 2 weeks

## 2020-02-19 ENCOUNTER — APPOINTMENT (OUTPATIENT)
Dept: SURGERY | Facility: CLINIC | Age: 35
End: 2020-02-19
Payer: COMMERCIAL

## 2020-02-19 VITALS
HEART RATE: 89 BPM | HEIGHT: 65 IN | TEMPERATURE: 99 F | BODY MASS INDEX: 30.66 KG/M2 | SYSTOLIC BLOOD PRESSURE: 113 MMHG | WEIGHT: 184 LBS | DIASTOLIC BLOOD PRESSURE: 75 MMHG

## 2020-02-19 PROCEDURE — 99024 POSTOP FOLLOW-UP VISIT: CPT

## 2020-02-19 NOTE — HISTORY OF PRESENT ILLNESS
[de-identified] : Vera is a healthy 33 y/o F who presented with a bowel obstruction due to phytobezoar at a Meckel's diverticulum. She underwent a minilaparotomy and small bowel resection. She recovered quited well. Today she reports minimal pain, no fevers/chills. Moving her bowels well and tolerating a regular diet.

## 2020-02-19 NOTE — PLAN
[FreeTextEntry1] : Vera is doing well s/p small bowel resection. Her pathology revealed ulcers in the diverticulum but is otherwise clear of any atypia or cancerous pathology. \par -clear for gradual increase of physical activity\par -regular diet\par -Monitor site for infection and/or hernia formation\par -no need for follow up unless any wound concerns\par \par I spent 15min reviewing data, images and information. Greater than 50% of my time was spent in face to face discussion regarding wound healing, postoperative diet and activity.\par \par Valentín Burgess MD\par Acute Care Surgery\par

## 2020-02-19 NOTE — PHYSICAL EXAM
[de-identified] : Well ,comfortable. [de-identified] : Soft, NT, ND. Incisions C/D/I. No evidence of cellulitis or erythema.

## 2020-02-24 ENCOUNTER — APPOINTMENT (OUTPATIENT)
Dept: TRAUMA SURGERY | Facility: CLINIC | Age: 35
End: 2020-02-24

## 2021-06-20 ENCOUNTER — EMERGENCY (EMERGENCY)
Facility: HOSPITAL | Age: 36
LOS: 1 days | Discharge: HOME | End: 2021-06-20
Attending: STUDENT IN AN ORGANIZED HEALTH CARE EDUCATION/TRAINING PROGRAM | Admitting: STUDENT IN AN ORGANIZED HEALTH CARE EDUCATION/TRAINING PROGRAM
Payer: COMMERCIAL

## 2021-06-20 VITALS
DIASTOLIC BLOOD PRESSURE: 86 MMHG | HEIGHT: 65 IN | HEART RATE: 87 BPM | RESPIRATION RATE: 16 BRPM | TEMPERATURE: 98 F | OXYGEN SATURATION: 100 % | SYSTOLIC BLOOD PRESSURE: 129 MMHG

## 2021-06-20 PROCEDURE — 99283 EMERGENCY DEPT VISIT LOW MDM: CPT

## 2021-06-20 RX ORDER — ACETAMINOPHEN 500 MG
650 TABLET ORAL ONCE
Refills: 0 | Status: COMPLETED | OUTPATIENT
Start: 2021-06-20 | End: 2021-06-20

## 2021-06-20 RX ORDER — IBUPROFEN 200 MG
600 TABLET ORAL ONCE
Refills: 0 | Status: COMPLETED | OUTPATIENT
Start: 2021-06-20 | End: 2021-06-20

## 2021-06-20 RX ORDER — IBUPROFEN 200 MG
1 TABLET ORAL
Qty: 15 | Refills: 0
Start: 2021-06-20 | End: 2021-06-24

## 2021-06-20 RX ORDER — CYCLOBENZAPRINE HYDROCHLORIDE 10 MG/1
1 TABLET, FILM COATED ORAL
Qty: 21 | Refills: 0
Start: 2021-06-20 | End: 2021-06-26

## 2021-06-20 RX ADMIN — Medication 600 MILLIGRAM(S): at 11:20

## 2021-06-20 RX ADMIN — Medication 650 MILLIGRAM(S): at 11:24

## 2021-06-20 NOTE — ED PROVIDER NOTE - CLINICAL SUMMARY MEDICAL DECISION MAKING FREE TEXT BOX
36 y/o F with PMH PCOS, Bowel obstruction p/w left sided back pain x 5 days. Pt reports pain  for last 5 days. She states pain is cramping located in the left back worse with movement. pt w/ paraspinal muslce spasm, normal neuro exam, no saddle anesthesia, no  or gi complaints. likely muscle spasm. will treat w/ cyclobenaprine, motrin tylenol, pmd and othro spine f/u

## 2021-06-20 NOTE — ED PROVIDER NOTE - NSFOLLOWUPINSTRUCTIONS_ED_ALL_ED_FT
During your ED visit you were evaluated for back pain. take motrin 600mg every 8 hours as needed for pain. Take cyclobenzaprine 10mg every 8 hours as needed for muscle spasm. Do not drink alcohol, drive or operate motorized vehicles while taking this medication.   Follow up with your PMD within 1 week. Return to the ED if you exhibit any new, continued or worsening symptoms.

## 2021-06-20 NOTE — ED ADULT TRIAGE NOTE - CHIEF COMPLAINT QUOTE
pt c/o mid, lower back pain x 5 days. pt reports pain started day before menstruation began. pt denies urinary sx, injury to area, fevers. hx of PCOS, IBS.

## 2021-06-20 NOTE — ED PROVIDER NOTE - OBJECTIVE STATEMENT
34 y/o F with PMH PCOS, Bowel obstruction p/w left sided back pain x 5 days. Pt reports pain  for last 5 days. She states pain is cramping located in the left back worse with movement. She reports improvement w/ walking. She denies numbness, weakness, fever, chills. She denies chest pain, dizziness, saddle ansthesia. She states she is currently menstruating. She denies cough. vomiting. last bm was today

## 2021-06-20 NOTE — ED PROVIDER NOTE - NS ED ROS FT
denies fever, chills, chest pain, SOB, abdominal pain, diarrhea, dysuria, syncope, bleeding, new rash,weakness, numbness, blurred vision  + back pain   ROS  otherwise negative as per HPI

## 2021-06-20 NOTE — ED PROVIDER NOTE - CARE PLAN
Principal Discharge DX:	Back pain  Assessment and plan of treatment:	During your ED visit you were evaluated for back pain. take motrin 600mg every 8 hours as needed for pain. Take cyclobenzaprine 10mg every 8 hours as needed for muscle spasm. Do not drink alcohol, drive or operate motorized vehicles while taking this medication.   Follow up with your PMD within 1 week. Return to the ED if you exhibit any new, continued or worsening symptoms.

## 2021-06-20 NOTE — ED ADULT TRIAGE NOTE - PATIENT ON (OXYGEN DELIVERY METHOD)
[FreeTextEntry1] : -  After a discussion of risks and benefits, the patient agreed to proceed with a cortisone injection.  \par -  Side: Right \par -  Finger: Ring finger\par -  Medications: 0.5 cc of 1% Lidocaine and 1 cc of Betamethasone, 6mg/cc, using sterile technique.\par -  Patient tolerated procedure well, without complications.\par -  Patient was told that the symptoms may worsen for a day or two, and should then begin to improve. \par -  Instructions: Patient was instructed on activity modification for the next several days.\par -  Follow-up: Within 4 weeks to assess response to the injection. room air

## 2021-06-20 NOTE — ED PROVIDER NOTE - PATIENT PORTAL LINK FT
You can access the FollowMyHealth Patient Portal offered by [facility] by registering at the following website: [email or web address]. By joining [employer]’s FollowMyHealth portal, you will also be able to view your health information using other applications (apps) compatible with our system.

## 2021-10-02 NOTE — ED PROVIDER NOTE - PMH
Factor 5 Leiden mutation, heterozygous    Pregnant    
73F w/ PMHx of HTN, DM2 [facility] complaining of worsening SOB and chest discomfort. Patient R/I NSTEMI and pulmonary edema with ECHO revealing EF 35%. Patient was transferred to [facility] 9/27/21 for a cardiac catheterization with possible PTCA/stent. Pt s/p cath on 9/27 CATH now transferred to [facility] for CABG evaluation and further management. Pt is s/p CABG x3.

## 2021-12-20 ENCOUNTER — APPOINTMENT (OUTPATIENT)
Dept: GASTROENTEROLOGY | Facility: CLINIC | Age: 36
End: 2021-12-20

## 2021-12-23 ENCOUNTER — APPOINTMENT (OUTPATIENT)
Dept: GASTROENTEROLOGY | Facility: CLINIC | Age: 36
End: 2021-12-23

## 2022-05-12 ENCOUNTER — APPOINTMENT (OUTPATIENT)
Dept: GASTROENTEROLOGY | Facility: CLINIC | Age: 37
End: 2022-05-12

## 2022-11-02 ENCOUNTER — APPOINTMENT (OUTPATIENT)
Dept: ORTHOPEDIC SURGERY | Facility: CLINIC | Age: 37
End: 2022-11-02
Payer: COMMERCIAL

## 2022-11-02 ENCOUNTER — NON-APPOINTMENT (OUTPATIENT)
Age: 37
End: 2022-11-02

## 2022-11-02 VITALS
WEIGHT: 215 LBS | BODY MASS INDEX: 35.82 KG/M2 | HEIGHT: 65 IN | DIASTOLIC BLOOD PRESSURE: 82 MMHG | SYSTOLIC BLOOD PRESSURE: 117 MMHG | HEART RATE: 84 BPM

## 2022-11-02 PROCEDURE — 72110 X-RAY EXAM L-2 SPINE 4/>VWS: CPT

## 2022-11-02 PROCEDURE — 72170 X-RAY EXAM OF PELVIS: CPT

## 2022-11-02 PROCEDURE — 99204 OFFICE O/P NEW MOD 45 MIN: CPT

## 2022-11-02 RX ORDER — METHYLPREDNISOLONE 4 MG/1
4 TABLET ORAL
Qty: 1 | Refills: 0 | Status: ACTIVE | COMMUNITY
Start: 2022-11-02 | End: 1900-01-01

## 2022-11-07 NOTE — DISCUSSION/SUMMARY
[Medication Risks Reviewed] : Medication risks reviewed [de-identified] : may want toradal at next visit, declined that today.\par Rx for medrol dosepak and gabapentin provided\par PT Rx provided\par MRI lumbar spine to assess for neural compression based on her radicular pain complaints and duration of symptoms.\par f/u after MRI, may consider lumbar DAILY for the radicular component of lumbar pain.\par \par The patient was educated regarding their condition, treatment options as well as prescribed course of treatment. \par Risks and benefits as well as alternatives to the proposed treatment were also provided to the patient \par They were given the opportunity to have all their questions answered to their satisfaction.\par \par Vital signs were reviewed with the patient and the patient was instructed to followup with their primary care provider for further management. There were no PAs or scribes used in the evaluation, exam or treatment plan discussion. The surgeon was the primary evaluating or treating physician as noted above.

## 2022-11-07 NOTE — PHYSICAL EXAM
[Normal] : Gait: normal [SLR] : positive straight leg raise [LE] : Sensory: Intact in bilateral lower extremities [1+] : left ankle jerk 1+ [DP] : dorsalis pedis 2+ and symmetric bilaterally [Obese] : obese [Plantar Reflex Right Only] : absent on the right [Plantar Reflex Left Only] : absent on the left [DTR Reflexes Clonus Of Right Ankle (___ Beats)] : absent on the right [DTR Reflexes Clonus Of Left Ankle (___ Beats)] : absent on the left [de-identified] : The pt is awake, alert and oriented to self, place and time, is comfortable and in no acute distress. Inspection of neck, back and lower extremities bilaterally reveals no rashes or ecchymotic lesions.  There is no obvious abnormal spinal curvature in the sagittal and coronal planes. There is no tenderness over the cervical, thoracic or lumbar spine, or the upper extremities musculature. There is no sacroiliac tenderness. No greater trochanteric tenderness bilaterally. No atrophy or abnormal movements noted in the upper or lower extremities. There is no swelling noted in the upper or lower extremities bilaterally. No cervical lymphadenopathy noted anteriorly. No joint laxity noted in the upper and lower extremity joints bilaterally.\par Hip range of motion is degrees internal rotation 30° external rotation without pain. Full range of motion of the shoulders bilaterally with no significant pain\par There is no groin pain with hip internal rotation and a negative COURTNEY test bilaterally.  [de-identified] : left gluteal tenderness, paraspinal tenderness [de-identified] : 4 views lumbar spine demonstrate no significant scoliosis.  Normal lumbar lordosis.  Loss of disc height noted at L5-S1.  No dynamic instability between flexion-extension.  No acute fractures.\par \par AP pelvis demonstrates normal appearance of the hips bilaterally.  No acute fractures.  No significant degeneration.

## 2022-11-07 NOTE — HISTORY OF PRESENT ILLNESS
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 Relocated to  since.\par Primarily left low back pain, has some left buttock and posterior explain [de-identified] : aleve  ambulating

## 2022-11-15 ENCOUNTER — APPOINTMENT (OUTPATIENT)
Dept: MRI IMAGING | Facility: CLINIC | Age: 37
End: 2022-11-15

## 2022-11-15 PROCEDURE — 72148 MRI LUMBAR SPINE W/O DYE: CPT

## 2022-11-23 ENCOUNTER — APPOINTMENT (OUTPATIENT)
Dept: ORTHOPEDIC SURGERY | Facility: CLINIC | Age: 37
End: 2022-11-23

## 2022-12-09 ENCOUNTER — APPOINTMENT (OUTPATIENT)
Dept: GASTROENTEROLOGY | Facility: CLINIC | Age: 37
End: 2022-12-09

## 2023-01-11 ENCOUNTER — APPOINTMENT (OUTPATIENT)
Dept: ORTHOPEDIC SURGERY | Facility: CLINIC | Age: 38
End: 2023-01-11
Payer: COMMERCIAL

## 2023-01-11 PROCEDURE — 99214 OFFICE O/P EST MOD 30 MIN: CPT

## 2023-01-11 RX ORDER — GABAPENTIN 100 MG/1
100 CAPSULE ORAL AT BEDTIME
Qty: 60 | Refills: 2 | Status: ACTIVE | COMMUNITY
Start: 2022-11-02 | End: 1900-01-01

## 2023-02-10 ENCOUNTER — APPOINTMENT (OUTPATIENT)
Dept: INTERNAL MEDICINE | Facility: CLINIC | Age: 38
End: 2023-02-10

## 2023-05-17 ENCOUNTER — APPOINTMENT (OUTPATIENT)
Dept: INTERNAL MEDICINE | Facility: CLINIC | Age: 38
End: 2023-05-17

## 2023-07-03 ENCOUNTER — APPOINTMENT (OUTPATIENT)
Dept: GASTROENTEROLOGY | Facility: CLINIC | Age: 38
End: 2023-07-03
Payer: COMMERCIAL

## 2023-07-03 VITALS
TEMPERATURE: 97.5 F | OXYGEN SATURATION: 98 % | BODY MASS INDEX: 37.02 KG/M2 | SYSTOLIC BLOOD PRESSURE: 111 MMHG | WEIGHT: 222.2 LBS | HEIGHT: 65 IN | DIASTOLIC BLOOD PRESSURE: 84 MMHG | HEART RATE: 97 BPM

## 2023-07-03 PROCEDURE — 99203 OFFICE O/P NEW LOW 30 MIN: CPT

## 2023-07-03 RX ORDER — FAMOTIDINE 20 MG/1
20 TABLET, FILM COATED ORAL DAILY
Qty: 30 | Refills: 5 | Status: ACTIVE | COMMUNITY
Start: 2023-07-03 | End: 1900-01-01

## 2023-07-03 NOTE — ASSESSMENT
[FreeTextEntry1] : The patient is a 37-year-old female who generally enjoys good health.  She does have several orthopedic issues.  The patient had a self-limiting episode of nausea, vomiting and diarrhea which I do not feel was on the basis of an intestinal obstruction.  The patient may have had an acute viral gastroenteritis.  This has left her with some mild dyspepsia and in this regard I started the patient on famotidine 20 mg once a day for several weeks.  The patient also has irritable bowel and occasionally notices left lower quadrant discomfort.  She was provided with a prescription for Librax to use as needed.  If the gastric symptoms persist the patient will require an upper endoscopy.  Beata will get back to me in several weeks with a progress report.

## 2023-07-03 NOTE — REVIEW OF SYSTEMS
[Fever] : no fever [Chills] : no chills [Recent Weight Gain (___ Lbs)] : recent [unfilled] ~Ulb weight gain [Chest Pain] : no chest pain [Palpitations] : no palpitations [SOB on Exertion] : no shortness of breath during exertion [As Noted in HPI] : as noted in HPI [Abdominal Pain] : abdominal pain [Constipation] : no constipation [Diarrhea] : no diarrhea [Heartburn] : no heartburn [Melena (black stool)] : no melena [Bloating (gassiness)] : bloating

## 2023-07-03 NOTE — HISTORY OF PRESENT ILLNESS
[FreeTextEntry1] : I saw patient Vera Jimenez in the office today.  The patient is a 37-year-old female with no history of hypertension diabetes or coronary disease.  The patient has had some back issues along with sciatica.  The symptoms have been worse since the patient has been working from home and gained weight.  Beata has a history of having a Meckel's diverticulitis.  The patient underwent resection and subsequently did develop an episode of small bowel obstruction the patient had been doing well until recently when she developed acute onset nausea and vomiting and diarrhea.  The patient was concerned that she may have had recurrence of a small bowel obstruction.  The symptoms resolved within 24 hours.  The patient does notice some residual dyspeptic symptoms and occasional left lower quadrant discomfort.  The patient has been on Librax in the past for underlying irritable bowel.  The patient consumes 2 caffeinated beverages a day, rarely has ethanol and does not smoke.  She is not taking any anti-inflammatories at the present time..

## 2023-07-03 NOTE — PHYSICAL EXAM
See #2 and #3. [Alert] : alert [Healthy Appearing] : healthy appearing [No Acute Distress] : no acute distress [No Respiratory Distress] : no respiratory distress [Auscultation Breath Sounds / Voice Sounds] : lungs were clear to auscultation bilaterally [Heart Rate And Rhythm] : heart rate was normal and rhythm regular [Murmurs] : no murmurs [Bowel Sounds] : normal bowel sounds [Abdomen Tenderness] : non-tender [Abdomen Soft] : soft [] : no hepatosplenomegaly

## 2023-07-12 NOTE — ED PROVIDER NOTE - PRINCIPAL DIAGNOSIS
-- [facility]    [facility]: [id number] [address] [facility] [address]    Patient confirmed the above pharmacy as correct?  Yes      Caller Information       Type Contact Phone/Fax    07/12/2023 10:14 AM CDT Phone (Incoming) Desiree Joseph (Self) [phone] (M)          Small bowel obstruction due to adhesions

## 2023-07-18 NOTE — DISCHARGE NOTE NURSING/CASE MANAGEMENT/SOCIAL WORK - REASON FOR REFUSAL (REFER PATIENT TO HEALTHCARE PROVIDER FOR FOLLOW-UP):
Does not take flu vaccine Positioning (Leave Blank If You Do Not Want): The patient was placed in a comfortable position exposing the surgical site.

## 2023-08-22 NOTE — ED ADULT TRIAGE NOTE - SOURCE OF INFORMATION
Pt called wanting to know if Hanane will be calling her soon about her test results.  She said she needs this information to decide what to do with the baby.  Pt states she spoke to Miriam yesterday regarding her results but has not received a call back.   Patient

## 2024-04-09 RX ORDER — HYOSCYAMINE SULFATE 0.38 MG/1
0.38 TABLET, EXTENDED RELEASE ORAL
Qty: 60 | Refills: 5 | Status: ACTIVE | COMMUNITY
Start: 2024-04-09 | End: 1900-01-01

## 2024-05-08 ENCOUNTER — APPOINTMENT (OUTPATIENT)
Dept: ORTHOPEDIC SURGERY | Facility: CLINIC | Age: 39
End: 2024-05-08
Payer: COMMERCIAL

## 2024-05-08 ENCOUNTER — APPOINTMENT (OUTPATIENT)
Dept: GASTROENTEROLOGY | Facility: CLINIC | Age: 39
End: 2024-05-08
Payer: COMMERCIAL

## 2024-05-08 VITALS
OXYGEN SATURATION: 98 % | TEMPERATURE: 97.5 F | SYSTOLIC BLOOD PRESSURE: 120 MMHG | HEART RATE: 78 BPM | BODY MASS INDEX: 33.66 KG/M2 | WEIGHT: 202 LBS | DIASTOLIC BLOOD PRESSURE: 70 MMHG | HEIGHT: 65 IN

## 2024-05-08 VITALS
SYSTOLIC BLOOD PRESSURE: 111 MMHG | WEIGHT: 200 LBS | HEIGHT: 65 IN | HEART RATE: 76 BPM | BODY MASS INDEX: 33.32 KG/M2 | DIASTOLIC BLOOD PRESSURE: 77 MMHG

## 2024-05-08 DIAGNOSIS — M54.16 RADICULOPATHY, LUMBAR REGION: ICD-10-CM

## 2024-05-08 DIAGNOSIS — M51.26 OTHER INTERVERTEBRAL DISC DISPLACEMENT, LUMBAR REGION: ICD-10-CM

## 2024-05-08 DIAGNOSIS — K76.0 FATTY (CHANGE OF) LIVER, NOT ELSEWHERE CLASSIFIED: ICD-10-CM

## 2024-05-08 DIAGNOSIS — M43.10 SPONDYLOLISTHESIS, SITE UNSPECIFIED: ICD-10-CM

## 2024-05-08 DIAGNOSIS — K58.9 IRRITABLE BOWEL SYNDROME W/OUT DIARRHEA: ICD-10-CM

## 2024-05-08 DIAGNOSIS — R10.13 EPIGASTRIC PAIN: ICD-10-CM

## 2024-05-08 PROCEDURE — 72110 X-RAY EXAM L-2 SPINE 4/>VWS: CPT

## 2024-05-08 PROCEDURE — 72170 X-RAY EXAM OF PELVIS: CPT

## 2024-05-08 PROCEDURE — 99214 OFFICE O/P EST MOD 30 MIN: CPT

## 2024-05-08 PROCEDURE — 99214 OFFICE O/P EST MOD 30 MIN: CPT | Mod: 25

## 2024-05-08 PROCEDURE — 96372 THER/PROPH/DIAG INJ SC/IM: CPT

## 2024-05-08 RX ORDER — METHOCARBAMOL 500 MG/1
500 TABLET, FILM COATED ORAL 3 TIMES DAILY
Qty: 30 | Refills: 0 | Status: ACTIVE | COMMUNITY
Start: 2024-05-08 | End: 1900-01-01

## 2024-05-08 RX ORDER — IBUPROFEN 600 MG/1
600 TABLET, FILM COATED ORAL 3 TIMES DAILY
Qty: 30 | Refills: 2 | Status: ACTIVE | COMMUNITY
Start: 2024-05-08 | End: 1900-01-01

## 2024-05-08 RX ORDER — CHLORDIAZEPOXIDE HYDROCHLORIDE AND CLIDINIUM BROMIDE 5; 2.5 MG/1; MG/1
5-2.5 CAPSULE, GELATIN COATED ORAL 3 TIMES DAILY
Qty: 30 | Refills: 1 | Status: ACTIVE | COMMUNITY
Start: 2023-07-03 | End: 1900-01-01

## 2024-05-08 RX ORDER — GABAPENTIN 300 MG/1
300 CAPSULE ORAL
Qty: 30 | Refills: 2 | Status: ACTIVE | COMMUNITY
Start: 2024-05-08 | End: 1900-01-01

## 2024-05-08 RX ORDER — METFORMIN HYDROCHLORIDE 500 MG/1
500 TABLET, COATED ORAL
Refills: 0 | Status: ACTIVE | COMMUNITY

## 2024-05-08 RX ORDER — KETOROLAC TROMETHAMINE 60 MG/2ML
60 INJECTION, SOLUTION INTRAMUSCULAR
Qty: 1 | Refills: 0 | Status: COMPLETED | OUTPATIENT
Start: 2024-05-08

## 2024-05-08 RX ADMIN — KETOROLAC TROMETHAMINE 0 MG/ML: 30 INJECTION, SOLUTION INTRAMUSCULAR; INTRAVENOUS at 00:00

## 2024-05-08 RX ADMIN — KETOROLAC TROMETHAMINE 0 MG/2ML: 60 INJECTION, SOLUTION INTRAMUSCULAR at 00:00

## 2024-05-08 NOTE — PHYSICAL EXAM
[Normal] : Gait: normal [SLR] : positive straight leg raise [LE] : Sensory: Intact in bilateral lower extremities [1+] : left ankle jerk 1+ [DP] : dorsalis pedis 2+ and symmetric bilaterally [Obese] : obese [Plantar Reflex Right Only] : absent on the right [Plantar Reflex Left Only] : absent on the left [DTR Reflexes Clonus Of Right Ankle (___ Beats)] : absent on the right [DTR Reflexes Clonus Of Left Ankle (___ Beats)] : absent on the left [de-identified] : The pt is awake, alert and oriented to self, place and time, is comfortable and in no acute distress. Inspection of neck, back and lower extremities bilaterally reveals no rashes or ecchymotic lesions.  There is no obvious abnormal spinal curvature in the sagittal and coronal planes. There is no tenderness over the cervical, thoracic or lumbar spine, or the upper extremities musculature. There is no sacroiliac tenderness. No greater trochanteric tenderness bilaterally. No atrophy or abnormal movements noted in the upper or lower extremities. There is no swelling noted in the upper or lower extremities bilaterally. No cervical lymphadenopathy noted anteriorly. No joint laxity noted in the upper and lower extremity joints bilaterally.\par  Hip range of motion is degrees internal rotation 30 external rotation without pain. Full range of motion of the shoulders bilaterally with no significant pain\par  There is no groin pain with hip internal rotation and a negative COURTNEY test bilaterally.  [de-identified] : left gluteal tenderness, paraspinal tenderness [de-identified] : - MRI from November 2022 showed an old stress fracture and a shifting of the spine at L5-S1.  -4 views lumbar spine x-rays from today showed no significant changes compared to the November 2022 images, with a stable 9mm anterolisthesis at L5-S1.  AP pelvis x-ray obtained today demonstrates no acute fractures.  No significant degeneration.

## 2024-05-08 NOTE — REVIEW OF SYSTEMS
[Fever] : no fever [Chills] : no chills [Feeling Tired] : not feeling tired [Recent Weight Loss (___ Lbs)] : no recent weight loss [Chest Pain] : no chest pain [Palpitations] : no palpitations [SOB on Exertion] : no shortness of breath during exertion [Abdominal Pain] : abdominal pain [Vomiting] : no vomiting [Diarrhea] : no diarrhea [Melena (black stool)] : no melena [Bleeding] : no bleeding [Bloating (gassiness)] : no bloating [As Noted in HPI] : as noted in HPI

## 2024-05-08 NOTE — HISTORY OF PRESENT ILLNESS
[FreeTextEntry1] : I saw patient Vera Jimenez in the office today.  The patient is a 38-year-old female who has a history of sciatica syndrome with lumbar radiculopathy.  The patient has chronic back pain and saw her orthopedic surgeon today.  She is currently on an anti-inflammatory and is scheduling physical therapy.  She admits to the fact that she sits most of the day since she works at home.  The patient has also been followed for irritable bowel which was manifested by irregularity of her bowel movements and an occasional right upper quadrant discomfort.  The patient went for a sonogram last year and does have known hepatic steatosis.  She is currently on metformin for glucose intolerance and states she is watching her diet.  The patient is due for repeat blood work with her primary care doctor in June Vera is up-to-date on her gynecological examinations.  The patient consumes 2 caffeinated beverages a day, drinks no ethanol and does not smoke.  The patient had recurrence of the right upper quadrant discomfort but after watching her diet she states she is asymptomatic at the present time.  Her bowel movements are currently normal with no blood in the stool or on the toilet tissue.

## 2024-05-08 NOTE — DISCUSSION/SUMMARY
[Medication Risks Reviewed] : Medication risks reviewed [de-identified] : - Summary : Beata Shrestha is experiencing a flare-up of her previous left buttock pain, likely related to her underlying spinal condition (old stress fracture and shifting at L5-S1). Her condition appears stable based on imaging, but she is experiencing significant pain and functional limitations. - Problems : - Exacerbation of left buttock pain  - Spinal anterolisthesis at L5-S1  - Old stress fracture at L5  - Differential Diagnosis : - The most likely diagnosis is a flare-up of her previous spinal condition, as evidenced by the stable imaging findings and her history of improvement with physical therapy.  - Other potential diagnoses include a new spinal injury or herniated disc, but these are less likely given the lack of significant changes on imaging and the absence of new neurological deficits.  - Chronic Conditions and Risk Factors : - Diabetes (well-controlled with metformin)  - Polycystic ovarian syndrome (PCOS)  - Sedentary lifestyle due to her occupation  Plan: - Summary : The plan involves providing immediate pain relief through medication and injections, as well as addressing the underlying condition through physical therapy and potential surgical intervention if symptoms persist or worsen. - Plan : - Administer a Toradol injection for immediate pain relief.  30 mg Toradol administered intermuscularly by the LPN without incident.  - Prescribe a course of ibuprofen (600 mg, up to 3 times daily) and a muscle relaxant (methocarbamol) for pain management  - Prescribe gabapentin (300 mg, once daily at bedtime) for nerve pain  - Refer for physical therapy to continue exercises and strengthen the core muscles  - Consider acupuncture as an adjunctive treatment if desired and covered by insurance  - Discuss the option of an epidural steroid injection or surgical intervention if symptoms do not improve or worsen over time

## 2024-05-08 NOTE — HISTORY OF PRESENT ILLNESS
[7] : a current pain level of 7/10 [Daily] : ~He/She~ states the symptoms seem to be occuring daily [Improving] : improving [___ wks] : [unfilled] week(s) ago [Prolonged Sitting] : worsened by prolonged sitting [Sitting] : worsened by sitting [de-identified] : Patient is here today due to acute flare up in her left buttock down her leg into her left knee  since last week and is not going away. Returned from [place] ~10 days ago [de-identified] : standing  lying down motrin

## 2024-05-08 NOTE — ASSESSMENT
[FreeTextEntry1] : The patient is a 38-year-old female with sciatica syndrome.  From the gastrointestinal perspective the patient had self-limiting right sided discomfort which appears to be related to dyspepsia.  The differential diagnosis does include mild radiculopathy.  The discomfort has resolved, and a sonogram done less than a year ago did not reveal cholelithiasis.  The patient does have hepatic steatosis and I reinforced the fact that moderation of carbohydrates, weight reduction, exercise and control of her blood glucose levels will help her in this regard.  I would request that a copy of her repeat blood work is sent to me.  If the liver enzymes worsen the patient should go for an elastography.  The plan was discussed in detail with the patient.  A prescription for Librax was renewed which she uses very rarely when she gets crampy lower abdominal discomfort.

## 2024-05-08 NOTE — PHYSICAL EXAM
[Alert] : alert [No Respiratory Distress] : no respiratory distress [Auscultation Breath Sounds / Voice Sounds] : lungs were clear to auscultation bilaterally [Heart Rate And Rhythm] : heart rate was normal and rhythm regular [Murmurs] : no murmurs [Bowel Sounds] : normal bowel sounds [No Masses] : no abdominal mass palpated [Abdomen Soft] : soft [] : no hepatosplenomegaly [de-identified] : The patient is mildly uncomfortable due to back pain

## 2024-09-30 NOTE — PACU DISCHARGE NOTE - NS MD DISCHARGE NOTE DISCHARGE
Floor
[FreeTextEntry1] : Stacey was seen in the office today for evaluation and management of an orthopedic issue.  Please excuse her absence from work today. Should you have any questions please call the office at [phone] Thank you for your understanding.     Shaw Alva DO, ATC Primary Care Sports Medicine [facility] [place]
